# Patient Record
Sex: FEMALE | Race: AMERICAN INDIAN OR ALASKA NATIVE
[De-identification: names, ages, dates, MRNs, and addresses within clinical notes are randomized per-mention and may not be internally consistent; named-entity substitution may affect disease eponyms.]

---

## 2018-07-27 ENCOUNTER — HOSPITAL ENCOUNTER (EMERGENCY)
Dept: HOSPITAL 5 - ED | Age: 28
Discharge: HOME | End: 2018-07-27
Payer: MEDICAID

## 2018-07-27 VITALS — SYSTOLIC BLOOD PRESSURE: 127 MMHG | DIASTOLIC BLOOD PRESSURE: 74 MMHG

## 2018-07-27 DIAGNOSIS — I10: ICD-10-CM

## 2018-07-27 DIAGNOSIS — E11.9: ICD-10-CM

## 2018-07-27 DIAGNOSIS — R50.9: ICD-10-CM

## 2018-07-27 DIAGNOSIS — M19.90: ICD-10-CM

## 2018-07-27 DIAGNOSIS — R51: ICD-10-CM

## 2018-07-27 DIAGNOSIS — J02.9: Primary | ICD-10-CM

## 2018-07-27 PROCEDURE — 87491 CHLMYD TRACH DNA AMP PROBE: CPT

## 2018-07-27 PROCEDURE — 99283 EMERGENCY DEPT VISIT LOW MDM: CPT

## 2018-07-27 NOTE — EMERGENCY DEPARTMENT REPORT
Minor Respiratory





- HPI


Chief Complaint: Upper Respiratory Infection


Stated Complaint: FLU LIKE SYMPTOMS


Time Seen by Provider: 18 07:27


Duration: 3 Days


Pain Location: Throat, Other (headache)


Severity: severe


Minor Respiratory: Yes Sore Throat (no drooling), Yes Ear Pain (ear pain), Yes 

Fever, No Rhinorrhea, No Able to Tolerate Fluids, No Cough, No Sick Contacts, 

No Hemoptysis, No Chest Pain, No Shortness of Breath


Other History: This is a 28-year-old female presents to emergency room with her 

family reports that she has been having flulike symptoms to include sore throat 

headache and earache over the last 3 days with cold sweat.  She says she has 

been taking over-the-counter Tylenol without any relief.  Pain is achy and 

constant.  No alleviating or exacerbating factors.  She too ibuprofen at 

midnight without any relief.  Patient had rapid RSV done which is negative.  

Denies any shortness of breath or chest pain.  Denies any nausea or vomiting.  

Denies any neck pain or stiffness.





ED Review of Systems


ROS: 


Stated complaint: FLU LIKE SYMPTOMS


Other details as noted in HPI





Constitutional: chills, fever


Eyes: denies: eye pain, eye discharge, vision change


ENT: ear pain, throat pain.  denies: epistaxis, congestion


Respiratory: denies: cough, shortness of breath, SOB with exertion, SOB at rest

, stridor, wheezing


Cardiovascular: denies: chest pain, palpitations, edema, syncope


Endocrine: no symptoms reported


Gastrointestinal: denies: abdominal pain, nausea, diarrhea


Genitourinary: denies: urgency, dysuria, frequency, hematuria, discharge


Musculoskeletal: denies: back pain, joint swelling, arthralgia


Skin: denies: rash, lesions, pruritus


Neurological: headache.  denies: weakness, numbness, paresthesias, confusion, 

abnormal gait, vertigo





ED Past Medical Hx





- Past Medical History


Previous Medical History?: Yes


Hx Hypertension: Yes


Hx Diabetes: Yes


Hx Arthritis: Yes





- Surgical History


Past Surgical History?: Yes


Additional Surgical History: 





- Family History


Family history: hypertension





- Social History


Smoking Status: Never Smoker


Substance Use Type: None





- Medications


Home Medications: 


 Home Medications











 Medication  Instructions  Recorded  Confirmed  Last Taken  Type


 


Clindamycin [Clindamycin CAP] 300 mg PO Q8H 10 Days #30 cap 07/27/18  Unknown Rx


 


Ibuprofen [Motrin] 600 mg PO Q8H PRN #12 tablet 18  Unknown Rx














Minor Respiratory Exam





- Exam


General: 


Vital signs noted. No distress. Alert and acting appropriately.


This is a 28-year-old female well-nourished well-developed in no acute distress.


HEENT: Yes Moist Mucous Membranes (uvula midline and oral airways patent), No 

Pharyngeal Erythema, No Pharyngeal Exudates, No Rhinorrhea, No Conjuctival 

Injection, No Frontal Tenderness, No Maxillary Tenderness


Ear: Neither TM Bulge, Neither TM Erythema, Neither EAC Pain, Neither EAC 

Discharge


Neck: Yes Adenopathy (anterior, anterior bilaterally), Yes Supple (full range 

of motion and no C-spine tenderness)


Lungs: Yes Good Air Exchange (CTAB), No Wheezes, No Ronchi, No Stridor, No Cough

, No Labored Respirations, No Retractions, No Use of Accessory Muscles, No 

Other Abnormal Lung Sounds


Heart: Yes Regular (tachycardic at 111), No Murmur


Abdomen: Yes Normal Bowel Sounds (in all quadrants), No Tenderness, No 

Peritoneal Signs


Skin: No Rash, No Edema


Neurologic: 


Alert and oriented 3, GCS of 15, normal gait, normal speech and no facial 

drooping.      


Musculoskeletal: 


Unremarkable.











ED Course


 Vital Signs











  18





  02:25 02:39 03:00


 


Temperature 102.2 F H 102.2 F H 


 


Pulse Rate 111 H  


 


Respiratory 18  18





Rate   


 


Blood Pressure 127/74  


 


O2 Sat by Pulse 97  





Oximetry   














  18





  06:16


 


Temperature 99.3 F


 


Pulse Rate 89


 


Respiratory 18





Rate 


 


Blood Pressure 


 


O2 Sat by Pulse 98





Oximetry 














- Reevaluation(s)


Reevaluation #1: 





18 08:36


She received Tylenol 1 g at 2:51 AM and she says she still has a headache and 

sore throat.  Fever and heart rate is better.


She received Motrin 800 mg at 7:55 AM.  This was to cover headache and sore 

throat or positive relief.  She is able to tolerate oral fluid.





ED Medical Decision Making





- Medical Decision Making





His is a 28-year-old female here reported headache, fever and right-sided sore 

throat over the last 3 days.  She is here to be evaluated.





Was examined by myself and she had exudative oropharynx with mild swelling and 

erythema, uvula midline and oral airways patent.  RSV was ordered by previous 

provided which was negative.  This was communicated to patient.  Mouth is 

moist.  No drooling.  Bilateral TM pearly gray and bilaterally before meals 

without any redness swelling or drainage.  Head exam is normal and neurological 

exam is normal.  Neck with positive; lymph node bilaterally and no C-spine 

tenderness and she has full range of motion otherwise all exams or normal.  

Current finding and with absence of cough based on Cent[employer] criteria patient  

with exudative pharyngitis, she also has headache, fever and enlarged lymph 

nodes.  She was given Tylenol 1 g by mouth in emergency room which relieved her 

headache momentarily.  Her vital signs are stable she is a febrile she was also 

given another 800 mg by mouth of Motrin in emergency room.  I discussed her 

diagnosis and treatment plan and she voiced understanding.





Exudative pharyngitis- will discharge home on clindamycin and encouraged to 

gargle warm salt water


Lymphadenopathy , cervical-resolve after pharyngitis is cleared


Headache-better she received Motrin 800 mg at 7:55 AM and Tylenol 1 g by mouth 

at 2:55 AM this morning.


Fever in adults-adequate Tylenol and Motrin.  She is able to tolerate fluids.  

We will discharge home and Motrin





Patient is stable, discharged home with her family.  Vital signs stable she is 

a febrile and she says she is feeling better.  I discussed the patient that she 

needs to follow-up with her primary care physician in 3 days follow-up with 

strep throat and she voiced understanding.  I discussed with her that if her 

symptoms worsens to return to the emergency room.  She voiced understanding.  

Patient discharged home with prescription for Motrin and clindamycin.





- Differential Diagnosis


PTA, exudative pharyngitis, rhinitis, sinusitis, viral syndrome


Critical care attestation.: 


If time is entered above; I have spent that time in minutes in the direct care 

of this critically ill patient, excluding procedure time.








ED Disposition


Clinical Impression: 


 Exudative pharyngitis, Fever in adult





Headache


Qualifiers:


 Headache type: unspecified Headache chronicity pattern: episodic headache 

Intractability: not intractable Qualified Code(s): R51 - Headache





Disposition: DC-01 TO HOME OR SELFCARE


Is pt being admited?: No


Does the pt Need Aspirin: No


Condition: Stable


Instructions:  Strep Throat (ED), Acute Headache (ED), Fever in Adults (ED)


Additional Instructions: 


Please take antibiotic as prescribed


Follow-up with primary care physician in 3 days


Take Motrin as prescribed for pain but take with food


If your condition worsens to include difficulty breathing, swallowing, chest 

pain, nausea and vomiting and fever does not relief with Motrin please return 

to the emergency room ASAP.


 Gargle with warm salt water and this will help to relieve sore throat


Referrals: 


AUTUMN MORATAYA MD [Primary Care Provider] - 18


Forms:  Work/School Release Form(ED)